# Patient Record
Sex: FEMALE | Employment: FULL TIME | URBAN - METROPOLITAN AREA
[De-identification: names, ages, dates, MRNs, and addresses within clinical notes are randomized per-mention and may not be internally consistent; named-entity substitution may affect disease eponyms.]

---

## 2022-10-13 ENCOUNTER — OFFICE VISIT (OUTPATIENT)
Dept: PRIMARY CARE CLINIC | Age: 22
End: 2022-10-13
Payer: COMMERCIAL

## 2022-10-13 VITALS
BODY MASS INDEX: 21.21 KG/M2 | TEMPERATURE: 98.4 F | WEIGHT: 132 LBS | HEART RATE: 71 BPM | SYSTOLIC BLOOD PRESSURE: 108 MMHG | HEIGHT: 66 IN | OXYGEN SATURATION: 99 % | DIASTOLIC BLOOD PRESSURE: 64 MMHG

## 2022-10-13 DIAGNOSIS — H10.32 ACUTE BACTERIAL CONJUNCTIVITIS OF LEFT EYE: Primary | ICD-10-CM

## 2022-10-13 PROCEDURE — 99213 OFFICE O/P EST LOW 20 MIN: CPT | Performed by: NURSE PRACTITIONER

## 2022-10-13 RX ORDER — DROSPIRENONE AND ETHINYL ESTRADIOL 0.03MG-3MG
KIT ORAL
COMMUNITY
Start: 2022-05-19

## 2022-10-13 RX ORDER — GUAIFENESIN 600 MG/1
600-1200 TABLET, EXTENDED RELEASE ORAL 2 TIMES DAILY PRN
COMMUNITY
Start: 2022-10-06 | End: 2022-10-13

## 2022-10-13 RX ORDER — POLYMYXIN B SULFATE AND TRIMETHOPRIM 1; 10000 MG/ML; [USP'U]/ML
1 SOLUTION OPHTHALMIC EVERY 4 HOURS
Qty: 1 EACH | Refills: 1 | Status: SHIPPED | OUTPATIENT
Start: 2022-10-13 | End: 2022-10-20

## 2022-10-13 RX ORDER — TRIAMCINOLONE ACETONIDE 1 MG/G
CREAM TOPICAL
COMMUNITY
Start: 2021-03-31

## 2022-10-13 SDOH — ECONOMIC STABILITY: FOOD INSECURITY: WITHIN THE PAST 12 MONTHS, THE FOOD YOU BOUGHT JUST DIDN'T LAST AND YOU DIDN'T HAVE MONEY TO GET MORE.: NEVER TRUE

## 2022-10-13 SDOH — ECONOMIC STABILITY: TRANSPORTATION INSECURITY
IN THE PAST 12 MONTHS, HAS LACK OF TRANSPORTATION KEPT YOU FROM MEETINGS, WORK, OR FROM GETTING THINGS NEEDED FOR DAILY LIVING?: NO

## 2022-10-13 SDOH — ECONOMIC STABILITY: FOOD INSECURITY: WITHIN THE PAST 12 MONTHS, YOU WORRIED THAT YOUR FOOD WOULD RUN OUT BEFORE YOU GOT MONEY TO BUY MORE.: NEVER TRUE

## 2022-10-13 SDOH — ECONOMIC STABILITY: TRANSPORTATION INSECURITY
IN THE PAST 12 MONTHS, HAS THE LACK OF TRANSPORTATION KEPT YOU FROM MEDICAL APPOINTMENTS OR FROM GETTING MEDICATIONS?: NO

## 2022-10-13 ASSESSMENT — PATIENT HEALTH QUESTIONNAIRE - PHQ9
SUM OF ALL RESPONSES TO PHQ QUESTIONS 1-9: 0
SUM OF ALL RESPONSES TO PHQ QUESTIONS 1-9: 0
2. FEELING DOWN, DEPRESSED OR HOPELESS: 0
1. LITTLE INTEREST OR PLEASURE IN DOING THINGS: 0
SUM OF ALL RESPONSES TO PHQ9 QUESTIONS 1 & 2: 0
SUM OF ALL RESPONSES TO PHQ QUESTIONS 1-9: 0
SUM OF ALL RESPONSES TO PHQ QUESTIONS 1-9: 0

## 2022-10-13 ASSESSMENT — ENCOUNTER SYMPTOMS
CONSTIPATION: 0
EYE ITCHING: 1
RHINORRHEA: 1
VOMITING: 0
APNEA: 0
EYE PAIN: 1
NAUSEA: 0
SINUS PAIN: 1
DOUBLE VISION: 0
ABDOMINAL DISTENTION: 0
WHEEZING: 0
EYE DISCHARGE: 1
COUGH: 0
SINUS PRESSURE: 1
SORE THROAT: 0
EYE REDNESS: 1

## 2022-10-13 ASSESSMENT — SOCIAL DETERMINANTS OF HEALTH (SDOH): HOW HARD IS IT FOR YOU TO PAY FOR THE VERY BASICS LIKE FOOD, HOUSING, MEDICAL CARE, AND HEATING?: NOT HARD AT ALL

## 2022-10-13 NOTE — PROGRESS NOTES
Subjective:      Patient ID: Tao Elder is a 19 y.o. female who presents today for:  Chief Complaint   Patient presents with    Conjunctivitis     Left eye. Woke up 10/12 with \"goop around the eye\". States no pain or itchiness but there is some redness and drainage. Sinus Problem     Onset 1 week ago, clear     Pt declines any visual changes or issues with vision. Pt declines needing an eye exam today. Conjunctivitis   The current episode started yesterday. The onset was sudden. The problem has been gradually worsening. The problem is mild. Nothing relieves the symptoms. Nothing aggravates the symptoms. Associated symptoms include eye itching, congestion, rhinorrhea, URI, eye discharge, eye pain and eye redness. Pertinent negatives include no fever, no decreased vision, no double vision, no constipation, no nausea, no vomiting, no headaches, no hearing loss, no sore throat, no muscle aches, no cough, no wheezing and no rash. History reviewed. No pertinent past medical history. History reviewed. No pertinent surgical history.   Social History     Socioeconomic History    Marital status:      Spouse name: Not on file    Number of children: Not on file    Years of education: Not on file    Highest education level: Not on file   Occupational History    Not on file   Tobacco Use    Smoking status: Never    Smokeless tobacco: Never   Substance and Sexual Activity    Alcohol use: Not on file    Drug use: Not on file    Sexual activity: Not on file   Other Topics Concern    Not on file   Social History Narrative    Not on file     Social Determinants of Health     Financial Resource Strain: Low Risk     Difficulty of Paying Living Expenses: Not hard at all   Food Insecurity: No Food Insecurity    Worried About Running Out of Food in the Last Year: Never true    [address] in the Last Year: Never true   Transportation Needs: No Transportation Needs    Lack of Transportation (Medical): No    Lack of Transportation (Non-Medical): No   Physical Activity: Not on file   Stress: Not on file   Social Connections: Not on file   Intimate Partner Violence: Not on file   Housing Stability: Not on file     History reviewed. No pertinent family history. Allergies   Allergen Reactions    Penicillins Rash         Review of Systems   Constitutional:  Negative for activity change, chills and fever. HENT:  Positive for congestion, rhinorrhea, sinus pressure and sinus pain. Negative for hearing loss and sore throat. Eyes:  Positive for pain, discharge, redness and itching. Negative for double vision. Respiratory:  Negative for apnea, cough and wheezing. Cardiovascular:  Negative for chest pain and palpitations. Gastrointestinal:  Negative for abdominal distention, constipation, nausea and vomiting. Genitourinary:  Negative for dysuria. Musculoskeletal:  Negative for arthralgias and myalgias. Skin:  Negative for rash. Neurological:  Negative for headaches. Hematological:  Negative for adenopathy. Psychiatric/Behavioral:  Negative for confusion. All other systems reviewed and are negative. Objective:   /64 (Site: Left Upper Arm, Position: Sitting, Cuff Size: Medium Adult)   Pulse 71   Temp 98.4 °F (36.9 °C) (Oral)   Ht 5' 5.5\" (1.664 m)   Wt 132 lb (59.9 kg)   SpO2 99%   BMI 21.63 kg/m²     Physical Exam  Vitals and nursing note reviewed. Constitutional:       General: She is awake. She is not in acute distress. Appearance: Normal appearance. She is well-developed, well-groomed and normal weight. She is not ill-appearing, toxic-appearing or diaphoretic. HENT:      Head: Normocephalic and atraumatic. Right Ear: Tympanic membrane normal.      Left Ear: Tympanic membrane normal.      Nose: Nose normal.      Mouth/Throat:      Mouth: Mucous membranes are moist.      Pharynx: Oropharynx is clear. No posterior oropharyngeal erythema.    Eyes:      General: Lids are normal. Lids Judgment: Judgment normal.       Assessment:       Diagnosis Orders   1. Acute bacterial conjunctivitis of left eye  trimethoprim-polymyxin b (POLYTRIM) 51407-0.1 UNIT/ML-% ophthalmic solution            Plan:      No orders of the defined types were placed in this encounter. Orders Placed This Encounter   Medications    trimethoprim-polymyxin b (POLYTRIM) 48702-2.1 UNIT/ML-% ophthalmic solution     Sig: Place 1 drop into the left eye every 4 hours for 7 days     Dispense:  1 each     Refill:  1     Pt here today with c/o eye pain, drg and itching. Pt declines eye exam as she denies any visual changes. Pt aware of how to instill the eye drops into her affected eye and if her other eye appears infected to use the drops and there is one refill for her. Pt verbalized understanding of the [facility] today. Pt left the [facility] today in stable condition. Discussed signs and symptoms which require immediate follow-up in ED/call to 911. Patient verbalized understanding. Warm compresses 2-3 times a day until resolved  Avoid rubbing the eyes as this can make symptoms worse  Gentle cleansing along the lash line w/ clean wash cloth- may add baby shampoo when cleansing   Gently clean along the lashes and eyelid margin. Use caution and avoid contacting the eye surface. If baby shampoo is used, rinse thoroughly.    Avoid vigorous washing-> may cause more irritation   Apply ointment to clean, dry eyelids    For Eye Allergy Symptoms--> itching, redness  Avoid rubbing your eyes, because rubbing can cause worsening of symptoms  Cool compresses can help reduce swelling of eyelid and under eye area  Frequent use of refrigerated artificial tears throughout the day can also help to dilute and remove allergens  If using > 1 type of eye drop, space drops a few (three to five) minutes apart if possible, so that instillation of a second drop does not wash out the first  In addition, closure of the eyelids for a few seconds after drug instillation helps absorption into ocular tissues   Repetitive blinking should be avoided as much as possible, as it generates negative pressure and causes topical medications to wash out of the eye surface more quickly      Return/see PCP if symptoms do not improve in 2-3 days or worsen in any way - increased pain or swelling, vision problems       Return if symptoms worsen or fail to improve. Reviewed with the patient: current clinical status, medications, activities and diet. Side effects, adverse effects of the medication prescribed today, as well as treatment plan and result expectations have been discussed with the patient who expresses understanding and desires to proceed. Close follow up to evaluate treatment results and for coordination of care. I have reviewed the patient's medical history in detail and updated the computerized patient record.       Thang Haney, APRN - CNP

## 2023-09-15 PROBLEM — D31.02 NEVUS OF LEFT CONJUNCTIVA: Status: ACTIVE | Noted: 2023-09-15

## 2023-09-15 PROBLEM — H52.203 MYOPIA OF BOTH EYES WITH ASTIGMATISM: Status: ACTIVE | Noted: 2023-09-15

## 2023-09-15 PROBLEM — H52.13 MYOPIA OF BOTH EYES WITH ASTIGMATISM: Status: ACTIVE | Noted: 2023-09-15

## 2023-09-15 PROBLEM — H43.393 VITREOUS FLOATERS OF BOTH EYES: Status: ACTIVE | Noted: 2023-09-15

## 2023-09-15 RX ORDER — DROSPIRENONE AND ETHINYL ESTRADIOL 0.03MG-3MG
1 KIT ORAL DAILY
COMMUNITY
Start: 2018-06-18

## 2023-09-15 RX ORDER — DROSPIRENONE AND ETHINYL ESTRADIOL 0.03MG-3MG
KIT ORAL
COMMUNITY
Start: 2019-05-28

## 2023-10-17 ENCOUNTER — OFFICE VISIT (OUTPATIENT)
Dept: OPHTHALMOLOGY | Facility: CLINIC | Age: 23
End: 2023-10-17
Payer: COMMERCIAL

## 2023-10-17 DIAGNOSIS — H43.393 VITREOUS FLOATERS OF BOTH EYES: Primary | ICD-10-CM

## 2023-10-17 DIAGNOSIS — H52.203 MYOPIA OF BOTH EYES WITH ASTIGMATISM: ICD-10-CM

## 2023-10-17 DIAGNOSIS — H52.13 MYOPIA OF BOTH EYES WITH ASTIGMATISM: ICD-10-CM

## 2023-10-17 PROCEDURE — V2520 CONTACT LENS HYDROPHILIC: HCPCS | Performed by: OPTOMETRIST

## 2023-10-17 PROCEDURE — 92015 DETERMINE REFRACTIVE STATE: CPT | Performed by: OPTOMETRIST

## 2023-10-17 PROCEDURE — 92014 COMPRE OPH EXAM EST PT 1/>: CPT | Performed by: OPTOMETRIST

## 2023-10-17 PROCEDURE — FLVLF CONTACT LENS EVALUATION (SP): Performed by: OPTOMETRIST

## 2023-10-17 ASSESSMENT — REFRACTION_MANIFEST
OD_SPHERE: -2.00
OS_CYLINDER: -0.50
OD_CYLINDER: -0.50
OS_SPHERE: -2.00
OD_AXIS: 075
OS_AXIS: 120

## 2023-10-17 ASSESSMENT — ENCOUNTER SYMPTOMS
CONSTITUTIONAL NEGATIVE: 0
CARDIOVASCULAR NEGATIVE: 0
ENDOCRINE NEGATIVE: 0
ALLERGIC/IMMUNOLOGIC NEGATIVE: 0
NEUROLOGICAL NEGATIVE: 0
PSYCHIATRIC NEGATIVE: 0
MUSCULOSKELETAL NEGATIVE: 0
RESPIRATORY NEGATIVE: 0
EYES NEGATIVE: 0
HEMATOLOGIC/LYMPHATIC NEGATIVE: 0
GASTROINTESTINAL NEGATIVE: 0

## 2023-10-17 ASSESSMENT — REFRACTION_WEARINGRX
OS_AXIS: 115
OD_CYLINDER: -0.50
OD_SPHERE: -1.50
OS_CYLINDER: -0.50
OS_SPHERE: -1.75
OD_AXIS: 076

## 2023-10-17 ASSESSMENT — REFRACTION_CURRENTRX
OD_BASECURVE: 8.6
OS_DIAMETER: 14.2
OS_BASECURVE: 8.6
OD_BRAND: BIOTRUE ONE DAY
OD_SPHERE: -2.00
OS_BRAND: BIOTRUE ONE DAY
OS_SPHERE: -2.00
OD_DIAMETER: 14.2

## 2023-10-17 ASSESSMENT — VISUAL ACUITY
CORRECTION_TYPE: CONTACTS
OD_CC: 20/20
VA_OR_OD_CURRENT_RX: 20/20
VA_OR_OS_CURRENT_RX: 20/20
METHOD: SNELLEN - LINEAR
OS_CC: 20/20

## 2023-10-17 ASSESSMENT — TONOMETRY
OD_IOP_MMHG: 16
IOP_METHOD: GOLDMANN APPLANATION
OS_IOP_MMHG: 16

## 2023-10-17 ASSESSMENT — EXTERNAL EXAM - RIGHT EYE: OD_EXAM: NORMAL

## 2023-10-17 ASSESSMENT — CUP TO DISC RATIO
OD_RATIO: 0.0
OS_RATIO: 0.0

## 2023-10-17 ASSESSMENT — SLIT LAMP EXAM - LIDS
COMMENTS: NORMAL
COMMENTS: NORMAL

## 2023-10-17 ASSESSMENT — EXTERNAL EXAM - LEFT EYE: OS_EXAM: NORMAL

## 2023-10-17 NOTE — PROGRESS NOTES
A spectacle prescription was dispensed to be used as needed. A contact lens prescription was dispensed at the patient's request. Contact lens (CL) order placed. The patient was asked to return to our clinic in one year or sooner if ocular or vision changes occur.

## 2024-10-15 ENCOUNTER — APPOINTMENT (OUTPATIENT)
Dept: OPHTHALMOLOGY | Facility: CLINIC | Age: 24
End: 2024-10-15
Payer: COMMERCIAL

## 2024-10-15 DIAGNOSIS — D31.02 NEVUS OF LEFT CONJUNCTIVA: Primary | ICD-10-CM

## 2024-10-15 DIAGNOSIS — H52.203 MYOPIA OF BOTH EYES WITH ASTIGMATISM: ICD-10-CM

## 2024-10-15 DIAGNOSIS — H43.393 VITREOUS FLOATERS OF BOTH EYES: ICD-10-CM

## 2024-10-15 DIAGNOSIS — H52.13 MYOPIA OF BOTH EYES WITH ASTIGMATISM: ICD-10-CM

## 2024-10-15 RX ORDER — CETIRIZINE HYDROCHLORIDE 10 MG/1
10 TABLET ORAL DAILY
COMMUNITY
Start: 2023-12-16 | End: 2024-12-15

## 2024-10-15 ASSESSMENT — REFRACTION_MANIFEST
OS_AXIS: 115
OD_AXIS: 075
OS_CYLINDER: -0.50
OD_SPHERE: -2.00
OS_SPHERE: -2.00
OD_CYLINDER: -0.50

## 2024-10-15 ASSESSMENT — TONOMETRY
OS_IOP_MMHG: 20
OD_IOP_MMHG: 19
IOP_METHOD: TONOPEN

## 2024-10-15 ASSESSMENT — REFRACTION_CURRENTRX
OS_SPHERE: -2.00
OS_DIAMETER: 14.2
OS_BASECURVE: 8.6
OD_DIAMETER: 14.2
OD_SPHERE: -2.00
OS_BRAND: BIOTRUE ONE DAY
OD_BRAND: BIOTRUE ONE DAY
OD_BASECURVE: 8.6

## 2024-10-15 ASSESSMENT — VISUAL ACUITY
OS_CC: 20/20-2
VA_OR_OD_CURRENT_RX: 20/20
CORRECTION_TYPE: CONTACTS
METHOD: SNELLEN - LINEAR
OD_CC: 20/20
VA_OR_OS_CURRENT_RX: 20/20-1

## 2024-10-15 ASSESSMENT — ENCOUNTER SYMPTOMS
GASTROINTESTINAL NEGATIVE: 0
EYES NEGATIVE: 1
CONSTITUTIONAL NEGATIVE: 0
ENDOCRINE NEGATIVE: 0
HEMATOLOGIC/LYMPHATIC NEGATIVE: 0
RESPIRATORY NEGATIVE: 0
CARDIOVASCULAR NEGATIVE: 0
ALLERGIC/IMMUNOLOGIC NEGATIVE: 0
PSYCHIATRIC NEGATIVE: 0
MUSCULOSKELETAL NEGATIVE: 0
NEUROLOGICAL NEGATIVE: 0

## 2024-10-15 ASSESSMENT — SLIT LAMP EXAM - LIDS
COMMENTS: NORMAL
COMMENTS: NORMAL

## 2024-10-15 ASSESSMENT — CUP TO DISC RATIO
OS_RATIO: 0.0
OD_RATIO: 0.0

## 2024-10-15 ASSESSMENT — EXTERNAL EXAM - LEFT EYE: OS_EXAM: NORMAL

## 2024-10-15 ASSESSMENT — CONF VISUAL FIELD
OS_INFERIOR_NASAL_RESTRICTION: 0
OD_NORMAL: 1
OS_NORMAL: 1
OD_SUPERIOR_NASAL_RESTRICTION: 0
OS_SUPERIOR_NASAL_RESTRICTION: 0
OD_INFERIOR_TEMPORAL_RESTRICTION: 0
OS_INFERIOR_TEMPORAL_RESTRICTION: 0
METHOD: COUNTING FINGERS
OD_INFERIOR_NASAL_RESTRICTION: 0
OD_SUPERIOR_TEMPORAL_RESTRICTION: 0
OS_SUPERIOR_TEMPORAL_RESTRICTION: 0

## 2024-10-15 ASSESSMENT — REFRACTION_WEARINGRX
OS_AXIS: 120
OS_SPHERE: -2.00
OD_AXIS: 075
OD_CYLINDER: -0.50
OS_CYLINDER: -0.50
OD_SPHERE: -2.00

## 2024-10-15 ASSESSMENT — EXTERNAL EXAM - RIGHT EYE: OD_EXAM: NORMAL

## 2024-10-15 NOTE — PROGRESS NOTES
New Rx for glasses given per patient request. Patient's signature obtained to acknowledge and confirm that a paper copy of glasses Rx was given to patient in compliance with [facility] Eyeglass Rule. Electronic copy of Rx will also be available via [employer].      A contact lens prescription was dispensed at the patient's request. Contact lens (CL) order placed.     Conjunctival nevus stable. Floaters stable. The patient was asked to return to our clinic or seek out eye care ASAP if new flashes of light or floaters are noted.      The patient was asked to return to our clinic in one year or sooner if ocular or vision changes occur.

## 2025-10-18 ENCOUNTER — APPOINTMENT (OUTPATIENT)
Dept: OPHTHALMOLOGY | Facility: CLINIC | Age: 25
End: 2025-10-18
Payer: COMMERCIAL